# Patient Record
Sex: FEMALE | Race: WHITE | Employment: OTHER | URBAN - METROPOLITAN AREA
[De-identification: names, ages, dates, MRNs, and addresses within clinical notes are randomized per-mention and may not be internally consistent; named-entity substitution may affect disease eponyms.]

---

## 2021-11-09 ENCOUNTER — HOSPITAL ENCOUNTER (EMERGENCY)
Age: 31
Discharge: HOME | End: 2021-11-09
Attending: EMERGENCY MEDICINE
Payer: OTHER GOVERNMENT

## 2021-11-09 ENCOUNTER — APPOINTMENT (OUTPATIENT)
Dept: GENERAL RADIOLOGY | Age: 31
End: 2021-11-09
Attending: PHYSICIAN ASSISTANT
Payer: OTHER GOVERNMENT

## 2021-11-09 VITALS
BODY MASS INDEX: 27.72 KG/M2 | DIASTOLIC BLOOD PRESSURE: 71 MMHG | OXYGEN SATURATION: 98 % | TEMPERATURE: 97.8 F | HEART RATE: 60 BPM | RESPIRATION RATE: 16 BRPM | HEIGHT: 71 IN | WEIGHT: 198 LBS | SYSTOLIC BLOOD PRESSURE: 118 MMHG

## 2021-11-09 DIAGNOSIS — S61.319A LACERATION OF FINGER NAIL BED, INITIAL ENCOUNTER: ICD-10-CM

## 2021-11-09 DIAGNOSIS — S62.632B OPEN DISPLACED FRACTURE OF DISTAL PHALANX OF RIGHT MIDDLE FINGER, INITIAL ENCOUNTER: Primary | ICD-10-CM

## 2021-11-09 PROCEDURE — 75810000293 HC SIMP/SUPERF WND  RPR

## 2021-11-09 PROCEDURE — 99283 EMERGENCY DEPT VISIT LOW MDM: CPT

## 2021-11-09 PROCEDURE — 74011250637 HC RX REV CODE- 250/637: Performed by: PHYSICIAN ASSISTANT

## 2021-11-09 PROCEDURE — 73140 X-RAY EXAM OF FINGER(S): CPT

## 2021-11-09 PROCEDURE — 75810000107 HC EXCSN NAIL PART/COMPLETE

## 2021-11-09 RX ORDER — IBUPROFEN 600 MG/1
600 TABLET ORAL
Qty: 20 TABLET | Refills: 0 | Status: SHIPPED | OUTPATIENT
Start: 2021-11-09

## 2021-11-09 RX ORDER — IBUPROFEN 600 MG/1
600 TABLET ORAL
Status: DISCONTINUED | OUTPATIENT
Start: 2021-11-09 | End: 2021-11-09

## 2021-11-09 RX ORDER — CEPHALEXIN 250 MG/1
500 CAPSULE ORAL
Status: COMPLETED | OUTPATIENT
Start: 2021-11-09 | End: 2021-11-09

## 2021-11-09 RX ORDER — HYDROCODONE BITARTRATE AND ACETAMINOPHEN 5; 325 MG/1; MG/1
1 TABLET ORAL
Qty: 12 TABLET | Refills: 0 | Status: SHIPPED | OUTPATIENT
Start: 2021-11-09 | End: 2021-11-14

## 2021-11-09 RX ORDER — CEPHALEXIN 500 MG/1
500 CAPSULE ORAL 4 TIMES DAILY
Qty: 28 CAPSULE | Refills: 0 | Status: SHIPPED | OUTPATIENT
Start: 2021-11-09 | End: 2021-11-16

## 2021-11-09 RX ADMIN — CEPHALEXIN 500 MG: 250 CAPSULE ORAL at 12:53

## 2021-11-09 NOTE — ED PROVIDER NOTES
EMERGENCY DEPARTMENT HISTORY AND PHYSICAL EXAM    Date: 11/9/2021  Patient Name: Kateryna    History of Presenting Illness     No chief complaint on file. 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 Associated sxs include fingernail injury and bleeding. Pt was lifting weights earlier today when she slammed finger against weights. Pt had Tetanus within last 5 years. Pt denies numbness, weakness, and any other sxs or complaints. PCP: Yumiko      Past History     Past Medical History:  No past medical history on file. Past Surgical History:  No past surgical history on file. Family History:  No family history on file. Social History:  Social History     Tobacco Use    Smoking status: Not on file    Smokeless tobacco: Not on file   Substance Use Topics    Alcohol use: Not on file    Drug use: Not on file       Allergies:  No Known Allergies      Review of Systems   Review of Systems   Musculoskeletal: Positive for arthralgias and joint swelling. Skin: Positive for wound (fingernail trauma). Neurological: Negative for weakness and numbness. All other systems reviewed and are negative. Physical Exam     Vitals:    11/09/21 1215   BP: 118/71   Pulse: 60   Resp: 16   Temp: 97.8 °F (36.6 °C)   SpO2: 98%   Weight: 89.8 kg (198 lb)   Height: 5' 11\" (1.803 m)     Physical Exam  Vitals and nursing note reviewed. Constitutional:       General: She is not in acute distress. Appearance: She is well-developed. She is not diaphoretic. Comments: AA female in NAD. Alert. Appears comfortable. HENT:      Head: Normocephalic and atraumatic. Right Ear: External ear normal.      Left Ear: External ear normal.      Nose: Nose normal.   Eyes:      General: No scleral icterus. Right eye: No discharge. Left eye: No discharge. Conjunctiva/sclera: Conjunctivae normal.   Cardiovascular:      Rate and Rhythm: Normal rate and regular rhythm. Pulses:           Radial pulses are 2+ on the right side and 2+ on the left side. Heart sounds: Normal heart sounds. Pulmonary:      Effort: Pulmonary effort is normal. No tachypnea or accessory muscle usage. Breath sounds: Normal breath sounds. No decreased breath sounds. Abdominal:      Palpations: Abdomen is soft. Musculoskeletal:         General: Normal range of motion. Hands:       Cervical back: Normal range of motion. Skin:     General: Skin is warm and dry. Neurological:      Mental Status: She is alert and oriented to person, place, and time. Psychiatric:         Judgment: Judgment normal.             Diagnostic Study Results     Labs -   No results found for this or any previous visit (from the past 12 hour(s)). XR 3RD FINGER RT MIN 2 V   Final Result      Comminuted crush fracture at the tip of distal phalanx. CT Results  (Last 48 hours)    None        CXR Results  (Last 48 hours)    None          Medications given in the ED-  Medications   cephALEXin (KEFLEX) capsule 500 mg (500 mg Oral Given 11/9/21 1253)         Medical Decision Making   I am the first provider for this patient. I reviewed the vital signs, available nursing notes, past medical history, past surgical history, family history and social history. Vital Signs-Reviewed the patient's vital signs. Pulse Oximetry Analysis - 98% on RA. NORMAL     Records Reviewed: Nursing Notes    Provider Notes (Medical Decision Making): crush-like injury to right third finger with near complete avulsion of nail. Will perform digital block, remove nail, and assess nailbed. Tetanus UTD.      Procedures:  Wound Repair    Date/Time: 11/9/2021 1:33 PM  Performed by: Isaias Yo provider: Dr. Mariah Cortez  Preparation: skin prepped with Shur-Clens and sterile field established  Time out: Immediately prior to the procedure a time out was called to verify the correct patient, procedure, equipment, staff and marking as appropriate. .  Location details: right long finger  Wound length:2.5 cm or less  Anesthesia: digital block    Anesthesia:  Local Anesthetic: lidocaine 1% without epinephrine  Anesthetic total: 5 mL  Foreign bodies: unknown  Irrigation solution: saline  Irrigation method: syringe  Debridement: fingernail removal.  Subcutaneous closure: Vicryl (nailbed repair)  Number of sutures: 3  Technique: simple  Approximation: close  Dressing: 4x4 and splint  Patient tolerance: patient tolerated the procedure well with no immediate complications  My total time at bedside, performing this procedure was 16-30 minutes. ED Course:   12:23 PM Initial assessment performed. The patients presenting problems have been discussed, and they are in agreement with the care plan formulated and outlined with them. I have encouraged them to ask questions as they arise throughout their visit. Diagnosis and Disposition       Digital block was performed with fingernail removed. Cleaned and irrigated nailbed and closed with absorbable vicryl. Discussed wound care at home and time table for nail to regrow. ABX as open fx. Tetanus UTD. Reasons to RTED discussed with pt. All questions answered. Pt feels comfortable going home at this time. Pt expressed understanding and she agrees with plan. 1. Open displaced fracture of distal phalanx of right middle finger, initial encounter    2. Laceration of finger nail bed, initial encounter        PLAN:  1. D/C Home  2. Discharge Medication List as of 11/9/2021  1:46 PM      START taking these medications    Details   HYDROcodone-acetaminophen (NORCO) 5-325 mg per tablet Take 1 Tablet by mouth every four (4) hours as needed for Pain for up to 5 days.  Max Daily Amount: 6 Tablets., Normal, Disp-12 Tablet, R-0      ibuprofen (MOTRIN) 600 mg tablet Take 1 Tablet by mouth every six (6) hours as needed for Pain. Take with food., Normal, Disp-20 Tablet, R-0      cephALEXin (Keflex) 500 mg capsule Take 1 Capsule by mouth four (4) times daily for 7 days. Indications: an infection of the skin and the tissue below the skin, Normal, Disp-28 Capsule, R-0           3. Follow-up Information     Follow up With Specialties Details Why [address]    THE [facility] EMERGENCY DEPT Emergency Medicine  As needed, If symptoms worsen [address] [id number]  [phone]        _______________________________    Attestations: This note is prepared by Mouna Cody PA-C.  _______________________________        Please note that this dictation was completed with Hoonto, the computer voice recognition software. Quite often unanticipated grammatical, syntax, homophones, and other interpretive errors are inadvertently transcribed by the computer software. Please disregard these errors. Please excuse any errors that have escaped final proofreading.

## 2021-11-09 NOTE — ED TRIAGE NOTES
Pt ambulatory to triage with complaints of a  RIGHT middle finger injury. PT states that her finger was smashed while using a shoulder press. Finger is bandaged, and was not visualized during triage, but pt reports that the nail is  from the nail bed.

## 2023-05-04 LAB — URINE CULTURE: ABNORMAL

## 2023-05-05 LAB
CHLAMYDIA TRACH., AMPLIFIED: NEGATIVE
N. GONORRHEA, AMPLIFIED: NEGATIVE
TRICHOMONAS VAGINALIS: NEGATIVE

## 2024-02-14 ENCOUNTER — APPOINTMENT (OUTPATIENT)
Dept: OBSTETRICS AND GYNECOLOGY | Facility: CLINIC | Age: 34
End: 2024-02-14
Payer: COMMERCIAL

## 2024-02-16 ENCOUNTER — APPOINTMENT (OUTPATIENT)
Dept: OBSTETRICS AND GYNECOLOGY | Facility: CLINIC | Age: 34
End: 2024-02-16
Payer: COMMERCIAL

## 2024-05-01 ENCOUNTER — OFFICE VISIT (OUTPATIENT)
Dept: OBSTETRICS AND GYNECOLOGY | Facility: CLINIC | Age: 34
End: 2024-05-01
Payer: COMMERCIAL

## 2024-05-01 ENCOUNTER — LAB (OUTPATIENT)
Dept: LAB | Facility: LAB | Age: 34
End: 2024-05-01
Payer: COMMERCIAL

## 2024-05-01 VITALS — BODY MASS INDEX: 25.33 KG/M2 | WEIGHT: 138.5 LBS

## 2024-05-01 DIAGNOSIS — N92.6 MISSED MENSES: Primary | ICD-10-CM

## 2024-05-01 DIAGNOSIS — Z11.3 SCREENING EXAMINATION FOR STI: ICD-10-CM

## 2024-05-01 DIAGNOSIS — N92.6 MISSED MENSES: ICD-10-CM

## 2024-05-01 DIAGNOSIS — R14.0 ABDOMINAL BLOATING: ICD-10-CM

## 2024-05-01 LAB
PREGNANCY TEST URINE, POC: NEGATIVE
TSH SERPL-ACNC: 1.49 MIU/L (ref 0.44–3.98)

## 2024-05-01 PROCEDURE — 36415 COLL VENOUS BLD VENIPUNCTURE: CPT

## 2024-05-01 PROCEDURE — 99214 OFFICE O/P EST MOD 30 MIN: CPT

## 2024-05-01 PROCEDURE — 87800 DETECT AGNT MULT DNA DIREC: CPT

## 2024-05-01 PROCEDURE — 81025 URINE PREGNANCY TEST: CPT

## 2024-05-01 PROCEDURE — 84443 ASSAY THYROID STIM HORMONE: CPT

## 2024-05-01 RX ORDER — POTASSIUM &MAGNESIUM ASPARTATE 250-250 MG
CAPSULE ORAL
COMMUNITY

## 2024-05-01 ASSESSMENT — ENCOUNTER SYMPTOMS
CONSTITUTIONAL NEGATIVE: 0
RESPIRATORY NEGATIVE: 0
HEMATOLOGIC/LYMPHATIC NEGATIVE: 0
ENDOCRINE NEGATIVE: 0
MUSCULOSKELETAL NEGATIVE: 0
NEUROLOGICAL NEGATIVE: 0
GASTROINTESTINAL NEGATIVE: 0
ALLERGIC/IMMUNOLOGIC NEGATIVE: 0
PSYCHIATRIC NEGATIVE: 0
CARDIOVASCULAR NEGATIVE: 0
EYES NEGATIVE: 0

## 2024-05-01 NOTE — PROGRESS NOTES
Subjective   Patient ID: Hillary Duron is a 33 y.o. female who presents for No chief complaint on file..    HPI  Patient presents to office today to discuss birth control -- On Blisovi 24 Fe since 4/2022 for contraception.  States that she was having very light periods with her pills, though has not had a period since November or December of 2023.  Has taken multiple negative pregnancy tests.  Is not taking her placebo pills, though does not skip to start another pack, just doesn't take anything for that week.  Also reports lower abdominal bloating, concern for weight gain.  Sexually active with same partner x 1 year.  Denies any vaginal discharge or odor.  Occasional pelvic cramping, though no pain with intercourse.    Review of Systems   Genitourinary:  Positive for menstrual problem.   All other systems reviewed and are negative.      Objective   Physical Exam  Constitutional:       Appearance: Normal appearance.   HENT:      Head: Normocephalic.   Pulmonary:      Effort: Pulmonary effort is normal.   Skin:     General: Skin is warm and dry.   Neurological:      Mental Status: She is alert and oriented to person, place, and time.   Psychiatric:         Mood and Affect: Mood normal.         Assessment/Plan   Diagnoses and all orders for this visit:  Missed menses  -     TSH with reflex to Free T4 if abnormal; Future  -     US PELVIS TRANSABDOMINAL WITH TRANSVAGINAL; Future  -     POCT pregnancy, urine manually resulted  Screening examination for STI  -     C. trachomatis + N. gonorrhoeae, Amplified  Abdominal bloating  -     US PELVIS TRANSABDOMINAL WITH TRANSVAGINAL; Future    Will await above test results; discussed skipping placebo pills entirely and beginning new pack each month to lessen patient anxiety regarding pregnancy if all above test results normal.  Patient agreeable.  Patient reports understanding; all questions answered at this time.    JORGE Simmons-SACHIN 05/01/24 3:34 PM

## 2024-05-02 LAB
C TRACH RRNA SPEC QL NAA+PROBE: NEGATIVE
N GONORRHOEA DNA SPEC QL PROBE+SIG AMP: NEGATIVE

## 2024-06-12 ENCOUNTER — HOSPITAL ENCOUNTER (OUTPATIENT)
Dept: RADIOLOGY | Facility: CLINIC | Age: 34
End: 2024-06-12
Payer: COMMERCIAL

## 2024-06-26 ENCOUNTER — TELEPHONE (OUTPATIENT)
Dept: OBSTETRICS AND GYNECOLOGY | Facility: CLINIC | Age: 34
End: 2024-06-26
Payer: COMMERCIAL

## 2024-06-26 DIAGNOSIS — Z30.41 ENCOUNTER FOR SURVEILLANCE OF CONTRACEPTIVE PILLS: ICD-10-CM

## 2024-06-26 RX ORDER — NORETHINDRONE ACETATE AND ETHINYL ESTRADIOL 1MG-20(24)
1 KIT ORAL DAILY
Qty: 84 TABLET | Refills: 4 | Status: SHIPPED | OUTPATIENT
Start: 2024-06-26 | End: 2024-06-27 | Stop reason: WASHOUT

## 2024-06-26 NOTE — TELEPHONE ENCOUNTER
Pt saw KD on 5/1 to discuss BC. Pt would like her current BC refilled but says pharmacy is saying they are waiting on prior auth from the provider. Please call pt to advise if refill can/can't be sent.

## 2024-06-27 RX ORDER — NORETHINDRONE ACETATE AND ETHINYL ESTRADIOL 1MG-20(24)
1 KIT ORAL DAILY
Qty: 84 TABLET | Refills: 0 | Status: SHIPPED | OUTPATIENT
Start: 2024-06-27

## 2024-06-29 ENCOUNTER — HOSPITAL ENCOUNTER (OUTPATIENT)
Dept: RADIOLOGY | Facility: CLINIC | Age: 34
End: 2024-06-29
Payer: COMMERCIAL